# Patient Record
Sex: MALE | Race: WHITE | NOT HISPANIC OR LATINO | Employment: PART TIME | URBAN - METROPOLITAN AREA
[De-identification: names, ages, dates, MRNs, and addresses within clinical notes are randomized per-mention and may not be internally consistent; named-entity substitution may affect disease eponyms.]

---

## 2023-08-25 ENCOUNTER — APPOINTMENT (OUTPATIENT)
Dept: CT IMAGING | Facility: HOSPITAL | Age: 68
End: 2023-08-25
Payer: COMMERCIAL

## 2023-08-25 ENCOUNTER — HOSPITAL ENCOUNTER (OUTPATIENT)
Facility: HOSPITAL | Age: 68
Setting detail: OBSERVATION
Discharge: HOME | End: 2023-08-26
Attending: EMERGENCY MEDICINE | Admitting: STUDENT IN AN ORGANIZED HEALTH CARE EDUCATION/TRAINING PROGRAM
Payer: COMMERCIAL

## 2023-08-25 ENCOUNTER — APPOINTMENT (EMERGENCY)
Dept: RADIOLOGY | Facility: HOSPITAL | Age: 68
End: 2023-08-25
Payer: COMMERCIAL

## 2023-08-25 DIAGNOSIS — K59.00 CONSTIPATION: ICD-10-CM

## 2023-08-25 DIAGNOSIS — I10 HYPERTENSION: ICD-10-CM

## 2023-08-25 DIAGNOSIS — R07.9 CHEST PAIN: ICD-10-CM

## 2023-08-25 DIAGNOSIS — J12.9 VIRAL PNEUMONIA: ICD-10-CM

## 2023-08-25 DIAGNOSIS — J18.9 PNEUMONIA: Primary | ICD-10-CM

## 2023-08-25 DIAGNOSIS — R06.82 TACHYPNEA: ICD-10-CM

## 2023-08-25 DIAGNOSIS — R91.8 GROUND GLASS OPACITY PRESENT ON IMAGING OF LUNG: ICD-10-CM

## 2023-08-25 PROBLEM — R93.89 ABNORMAL CT OF THE CHEST: Status: ACTIVE | Noted: 2023-08-25

## 2023-08-25 PROBLEM — R07.1 CHEST PAIN ON BREATHING: Status: ACTIVE | Noted: 2023-08-25

## 2023-08-25 LAB
2HR DELTA HS TROPONIN: 1 NG/L
4HR DELTA HS TROPONIN: 2 NG/L
ALBUMIN SERPL BCP-MCNC: 4.1 G/DL (ref 3.5–5)
ALP SERPL-CCNC: 153 U/L (ref 34–104)
ALT SERPL W P-5'-P-CCNC: 69 U/L (ref 7–52)
ANION GAP SERPL CALCULATED.3IONS-SCNC: 8 MMOL/L
AST SERPL W P-5'-P-CCNC: 39 U/L (ref 13–39)
ATRIAL RATE: 83 BPM
BASOPHILS # BLD AUTO: 0.04 THOUSANDS/ÂΜL (ref 0–0.1)
BASOPHILS NFR BLD AUTO: 1 % (ref 0–1)
BILIRUB SERPL-MCNC: 1.06 MG/DL (ref 0.2–1)
BUN SERPL-MCNC: 19 MG/DL (ref 5–25)
CALCIUM SERPL-MCNC: 9.5 MG/DL (ref 8.4–10.2)
CARDIAC TROPONIN I PNL SERPL HS: 12 NG/L
CARDIAC TROPONIN I PNL SERPL HS: 13 NG/L
CARDIAC TROPONIN I PNL SERPL HS: 14 NG/L
CHLORIDE SERPL-SCNC: 99 MMOL/L (ref 96–108)
CO2 SERPL-SCNC: 27 MMOL/L (ref 21–32)
CREAT SERPL-MCNC: 0.9 MG/DL (ref 0.6–1.3)
D DIMER PPP FEU-MCNC: 1.15 UG/ML FEU
EOSINOPHIL # BLD AUTO: 0.29 THOUSAND/ÂΜL (ref 0–0.61)
EOSINOPHIL NFR BLD AUTO: 4 % (ref 0–6)
ERYTHROCYTE [DISTWIDTH] IN BLOOD BY AUTOMATED COUNT: 15.5 % (ref 11.6–15.1)
FLUAV RNA RESP QL NAA+PROBE: NEGATIVE
FLUBV RNA RESP QL NAA+PROBE: NEGATIVE
GFR SERPL CREATININE-BSD FRML MDRD: 88 ML/MIN/1.73SQ M
GLUCOSE SERPL-MCNC: 98 MG/DL (ref 65–140)
HCT VFR BLD AUTO: 43.7 % (ref 36.5–49.3)
HGB BLD-MCNC: 14.2 G/DL (ref 12–17)
IMM GRANULOCYTES # BLD AUTO: 0.05 THOUSAND/UL (ref 0–0.2)
IMM GRANULOCYTES NFR BLD AUTO: 1 % (ref 0–2)
LYMPHOCYTES # BLD AUTO: 0.81 THOUSANDS/ÂΜL (ref 0.6–4.47)
LYMPHOCYTES NFR BLD AUTO: 11 % (ref 14–44)
MCH RBC QN AUTO: 27.6 PG (ref 26.8–34.3)
MCHC RBC AUTO-ENTMCNC: 32.5 G/DL (ref 31.4–37.4)
MCV RBC AUTO: 85 FL (ref 82–98)
MONOCYTES # BLD AUTO: 0.84 THOUSAND/ÂΜL (ref 0.17–1.22)
MONOCYTES NFR BLD AUTO: 12 % (ref 4–12)
NEUTROPHILS # BLD AUTO: 5.07 THOUSANDS/ÂΜL (ref 1.85–7.62)
NEUTS SEG NFR BLD AUTO: 71 % (ref 43–75)
NRBC BLD AUTO-RTO: 0 /100 WBCS
P AXIS: -26 DEGREES
PLATELET # BLD AUTO: 239 THOUSANDS/UL (ref 149–390)
PMV BLD AUTO: 9.2 FL (ref 8.9–12.7)
POTASSIUM SERPL-SCNC: 3.9 MMOL/L (ref 3.5–5.3)
PR INTERVAL: 150 MS
PROCALCITONIN SERPL-MCNC: 0.23 NG/ML
PROT SERPL-MCNC: 9 G/DL (ref 6.4–8.4)
QRS AXIS: -79 DEGREES
QRSD INTERVAL: 124 MS
QT INTERVAL: 396 MS
QTC INTERVAL: 465 MS
RBC # BLD AUTO: 5.15 MILLION/UL (ref 3.88–5.62)
RSV RNA RESP QL NAA+PROBE: NEGATIVE
SARS-COV-2 RNA RESP QL NAA+PROBE: NEGATIVE
SODIUM SERPL-SCNC: 134 MMOL/L (ref 135–147)
T WAVE AXIS: -3 DEGREES
VENTRICULAR RATE: 83 BPM
WBC # BLD AUTO: 7.1 THOUSAND/UL (ref 4.31–10.16)

## 2023-08-25 PROCEDURE — 71250 CT THORAX DX C-: CPT

## 2023-08-25 PROCEDURE — 99285 EMERGENCY DEPT VISIT HI MDM: CPT

## 2023-08-25 PROCEDURE — 85379 FIBRIN DEGRADATION QUANT: CPT | Performed by: STUDENT IN AN ORGANIZED HEALTH CARE EDUCATION/TRAINING PROGRAM

## 2023-08-25 PROCEDURE — 84484 ASSAY OF TROPONIN QUANT: CPT

## 2023-08-25 PROCEDURE — 71046 X-RAY EXAM CHEST 2 VIEWS: CPT

## 2023-08-25 PROCEDURE — 99223 1ST HOSP IP/OBS HIGH 75: CPT | Performed by: STUDENT IN AN ORGANIZED HEALTH CARE EDUCATION/TRAINING PROGRAM

## 2023-08-25 PROCEDURE — 93005 ELECTROCARDIOGRAM TRACING: CPT

## 2023-08-25 PROCEDURE — 0241U HB NFCT DS VIR RESP RNA 4 TRGT: CPT

## 2023-08-25 PROCEDURE — G1004 CDSM NDSC: HCPCS

## 2023-08-25 PROCEDURE — 85025 COMPLETE CBC W/AUTO DIFF WBC: CPT

## 2023-08-25 PROCEDURE — 80053 COMPREHEN METABOLIC PANEL: CPT

## 2023-08-25 PROCEDURE — 84145 PROCALCITONIN (PCT): CPT | Performed by: STUDENT IN AN ORGANIZED HEALTH CARE EDUCATION/TRAINING PROGRAM

## 2023-08-25 PROCEDURE — 36415 COLL VENOUS BLD VENIPUNCTURE: CPT

## 2023-08-25 PROCEDURE — 96365 THER/PROPH/DIAG IV INF INIT: CPT

## 2023-08-25 PROCEDURE — 93010 ELECTROCARDIOGRAM REPORT: CPT | Performed by: INTERNAL MEDICINE

## 2023-08-25 RX ORDER — ACETAMINOPHEN 325 MG/1
650 TABLET ORAL EVERY 6 HOURS PRN
Status: DISCONTINUED | OUTPATIENT
Start: 2023-08-25 | End: 2023-08-26 | Stop reason: HOSPADM

## 2023-08-25 RX ORDER — AMLODIPINE BESYLATE 5 MG/1
5 TABLET ORAL DAILY
Status: DISCONTINUED | OUTPATIENT
Start: 2023-08-25 | End: 2023-08-26 | Stop reason: HOSPADM

## 2023-08-25 RX ORDER — SENNOSIDES 8.6 MG
1 TABLET ORAL DAILY
Status: DISCONTINUED | OUTPATIENT
Start: 2023-08-25 | End: 2023-08-26 | Stop reason: HOSPADM

## 2023-08-25 RX ORDER — LISINOPRIL 5 MG/1
5 TABLET ORAL DAILY
Status: DISCONTINUED | OUTPATIENT
Start: 2023-08-25 | End: 2023-08-26 | Stop reason: HOSPADM

## 2023-08-25 RX ORDER — HEPARIN SODIUM 5000 [USP'U]/ML
5000 INJECTION, SOLUTION INTRAVENOUS; SUBCUTANEOUS EVERY 8 HOURS SCHEDULED
Status: DISCONTINUED | OUTPATIENT
Start: 2023-08-25 | End: 2023-08-26 | Stop reason: HOSPADM

## 2023-08-25 RX ORDER — ONDANSETRON 2 MG/ML
4 INJECTION INTRAMUSCULAR; INTRAVENOUS EVERY 6 HOURS PRN
Status: DISCONTINUED | OUTPATIENT
Start: 2023-08-25 | End: 2023-08-26 | Stop reason: HOSPADM

## 2023-08-25 RX ORDER — CEFTRIAXONE 1 G/50ML
1000 INJECTION, SOLUTION INTRAVENOUS EVERY 24 HOURS
Status: DISCONTINUED | OUTPATIENT
Start: 2023-08-26 | End: 2023-08-26 | Stop reason: HOSPADM

## 2023-08-25 RX ORDER — METHYLPREDNISOLONE SODIUM SUCCINATE 40 MG/ML
40 INJECTION, POWDER, LYOPHILIZED, FOR SOLUTION INTRAMUSCULAR; INTRAVENOUS DAILY
Status: DISCONTINUED | OUTPATIENT
Start: 2023-08-25 | End: 2023-08-26 | Stop reason: HOSPADM

## 2023-08-25 RX ORDER — DOCUSATE SODIUM 100 MG/1
100 CAPSULE, LIQUID FILLED ORAL 2 TIMES DAILY
Status: DISCONTINUED | OUTPATIENT
Start: 2023-08-25 | End: 2023-08-26 | Stop reason: HOSPADM

## 2023-08-25 RX ORDER — OMEPRAZOLE 20 MG/1
20 CAPSULE, DELAYED RELEASE ORAL DAILY
COMMUNITY

## 2023-08-25 RX ORDER — CALCIUM CARBONATE 500 MG/1
1000 TABLET, CHEWABLE ORAL DAILY PRN
Status: DISCONTINUED | OUTPATIENT
Start: 2023-08-25 | End: 2023-08-26 | Stop reason: HOSPADM

## 2023-08-25 RX ORDER — CEFTRIAXONE 1 G/50ML
1000 INJECTION, SOLUTION INTRAVENOUS ONCE
Status: COMPLETED | OUTPATIENT
Start: 2023-08-25 | End: 2023-08-25

## 2023-08-25 RX ADMIN — HEPARIN SODIUM 5000 UNITS: 5000 INJECTION INTRAVENOUS; SUBCUTANEOUS at 21:39

## 2023-08-25 RX ADMIN — AMLODIPINE BESYLATE 5 MG: 5 TABLET ORAL at 17:32

## 2023-08-25 RX ADMIN — HEPARIN SODIUM 5000 UNITS: 5000 INJECTION INTRAVENOUS; SUBCUTANEOUS at 17:31

## 2023-08-25 RX ADMIN — LISINOPRIL 5 MG: 5 TABLET ORAL at 17:32

## 2023-08-25 RX ADMIN — CEFTRIAXONE 1000 MG: 1 INJECTION, SOLUTION INTRAVENOUS at 14:00

## 2023-08-25 RX ADMIN — DOXYCYCLINE 100 MG: 100 INJECTION, POWDER, LYOPHILIZED, FOR SOLUTION INTRAVENOUS at 15:06

## 2023-08-25 RX ADMIN — METHYLPREDNISOLONE SODIUM SUCCINATE 40 MG: 40 INJECTION, POWDER, FOR SOLUTION INTRAMUSCULAR; INTRAVENOUS at 17:29

## 2023-08-25 RX ADMIN — ONDANSETRON 4 MG: 2 INJECTION INTRAMUSCULAR; INTRAVENOUS at 17:31

## 2023-08-25 NOTE — ED PROVIDER NOTES
History  Chief Complaint   Patient presents with   • Chest Pain     Pt presents ambulatory stating "I think my pneumonia is coming back. I've been coughing like crazy and started experiencing CP this morning that hasn't gone away." CP is nonradiating. Patient is a 60-year-old male with a past medical history of hypertension, depression, hidradenitis suppurativa presenting to the emergency department for evaluation of cough and chest pain. Patient reports he has had a productive cough for the past few days that he states feels similar to his prior pneumonia in the winter of last year. Patient reports today he had a sudden onset of left-sided chest pain that felt sharp in sensation. Patient denies any shortness of breath or difficulty breathing. Patient denies fevers at home. Patient reports he does see the [address] [place] [address] and was told to go to the emergency department for further evaluation. Patient reports he has tenderness to the left side of his chest when palpating. Patient denies any radiation of the chest pain. Patient denies any alleviation of the chest pain. Denies fevers, chills, rash, headache, weakness, dizziness, visual changes, abdominal pain, nausea, vomiting, diarrhea, constipation, shortness of breath or difficulty breathing. Does not offer any other concerns or complaints. None       History reviewed. No pertinent past medical history. History reviewed. No pertinent surgical history. History reviewed. No pertinent family history. I have reviewed and agree with the history as documented. E-Cigarette/Vaping     E-Cigarette/Vaping Substances     Social History     Tobacco Use   • Smoking status: Never   • Smokeless tobacco: Never   Substance Use Topics   • Alcohol use: Yes     Comment: 1-2 drinks a day   • Drug use: Yes     Types: Marijuana       Review of Systems   Constitutional: Negative for chills and fever. HENT: Negative for ear pain and sore throat.     Eyes: Negative for pain and visual disturbance. Respiratory: Positive for cough. Negative for shortness of breath. Cardiovascular: Positive for chest pain. Negative for palpitations. Gastrointestinal: Negative for abdominal pain, constipation, diarrhea, nausea and vomiting. Genitourinary: Negative for dysuria and hematuria. Musculoskeletal: Negative for arthralgias and back pain. Skin: Negative for color change and rash. Neurological: Negative for seizures and syncope. All other systems reviewed and are negative. Physical Exam  Physical Exam  Vitals and nursing note reviewed. Constitutional:       General: He is not in acute distress. Appearance: Normal appearance. He is well-developed. He is not toxic-appearing or diaphoretic. HENT:      Head: Normocephalic and atraumatic. Right Ear: External ear normal.      Left Ear: External ear normal.      Nose: Nose normal.      Mouth/Throat:      Mouth: Mucous membranes are moist.   Eyes:      General: No scleral icterus. Right eye: No discharge. Left eye: No discharge. Conjunctiva/sclera: Conjunctivae normal.   Cardiovascular:      Rate and Rhythm: Normal rate and regular rhythm. Heart sounds: No murmur heard. Pulmonary:      Effort: Pulmonary effort is normal. No respiratory distress. Breath sounds: Examination of the right-lower field reveals rhonchi. Rhonchi present. No decreased breath sounds, wheezing or rales. Chest:      Chest wall: Tenderness present. Abdominal:      Palpations: Abdomen is soft. Tenderness: There is no abdominal tenderness. Musculoskeletal:         General: No swelling, deformity or signs of injury. Normal range of motion. Cervical back: Normal range of motion and neck supple. No rigidity. Skin:     General: Skin is warm and dry. Capillary Refill: Capillary refill takes less than 2 seconds. Coloration: Skin is not jaundiced. Findings: No erythema or rash.    Neurological: General: No focal deficit present. Mental Status: He is alert and oriented to person, place, and time. Mental status is at baseline. Cranial Nerves: No cranial nerve deficit. Gait: Gait normal.   Psychiatric:         Mood and Affect: Mood normal.         Behavior: Behavior normal.         Thought Content: Thought content normal.         Judgment: Judgment normal.         Vital Signs  ED Triage Vitals   Temperature Pulse Respirations Blood Pressure SpO2   08/25/23 1215 08/25/23 1215 08/25/23 1215 08/25/23 1215 08/25/23 1215   98.3 °F (36.8 °C) 76 20 (!) 184/106 97 %      Temp Source Heart Rate Source Patient Position - Orthostatic VS BP Location FiO2 (%)   08/25/23 1215 08/25/23 1215 08/25/23 1245 08/25/23 1245 --   Oral Monitor Lying Left arm       Pain Score       --                  Vitals:    08/25/23 1245 08/25/23 1300 08/25/23 1357 08/25/23 1400   BP: 155/88 155/76 164/79 147/86   Pulse: 78 76 81 79   Patient Position - Orthostatic VS: Lying Lying Lying Sitting         Visual Acuity      ED Medications  Medications   doxycycline (VIBRAMYCIN) 100 mg in sodium chloride 0.9 % 100 mL IVPB (has no administration in time range)   cefTRIAXone (ROCEPHIN) IVPB (premix in dextrose) 1,000 mg 50 mL (1,000 mg Intravenous New Bag 8/25/23 1400)       Diagnostic Studies  Results Reviewed     Procedure Component Value Units Date/Time    FLU/RSV/COVID - if FLU/RSV clinically relevant [[id number]]  (Normal) Collected: 08/25/23 1241    Lab Status: Final result Specimen: Nares from Nose Updated: 08/25/23 1342     SARS-CoV-2 Negative     INFLUENZA A PCR Negative     INFLUENZA B PCR Negative     RSV PCR Negative    Narrative:      FOR PEDIATRIC PATIENTS - copy/paste COVID Guidelines URL to browser: [email or web address]    SARS-CoV-2 assay is a Nucleic Acid Amplification assay intended for the  qualitative detection of nucleic acid from SARS-CoV-2 in nasopharyngeal  swabs. Results are for the presumptive identification of SARS-CoV-2 RNA. Positive results are indicative of infection with SARS-CoV-2, the virus  causing COVID-19, but do not rule out bacterial infection or co-infection  with other viruses. Laboratories within the [facility] and its  territories are required to report all positive results to the appropriate  public health authorities. Negative results do not preclude SARS-CoV-2  infection and should not be used as the sole basis for treatment or other  patient management decisions. Negative results must be combined with  clinical observations, patient history, and epidemiological information. This test has not been FDA cleared or approved. This test has been authorized by FDA under an Emergency Use Authorization  (EUA). This test is only authorized for the duration of time the  declaration that circumstances exist justifying the authorization of the  emergency use of an in vitro diagnostic tests for detection of SARS-CoV-2  virus and/or diagnosis of COVID-19 infection under section 564(b)(1) of  the Act, 21 U. S.C. 699ZOH-2(E)(8), unless the authorization is terminated  or revoked sooner. The test has been validated but independent review by FDA  and CLIA is pending. Test performed using [employer] GeneXpert: This RT-PCR assay targets N2,  a region unique to SARS-CoV-2. A conserved region in the E-gene was chosen  for pan-Sarbecovirus detection which includes SARS-CoV-2. According to CMS-2020-01-R, this platform meets the definition of high-throughput technology.     HS Troponin 0hr (reflex protocol) [[id number]]  (Normal) Collected: 08/25/23 1241    Lab Status: Final result Specimen: Blood from Arm, Right Updated: 08/25/23 1323     hs TnI 0hr 12 ng/L     HS Troponin I 2hr [[id number]]     Lab Status: No result Specimen: Blood     Comprehensive metabolic panel [[id number]]  (Abnormal) Collected: 08/25/23 1241    Lab Status: Final result Specimen: Blood from Arm, Right Updated: 08/25/23 1317     Sodium 134 mmol/L      Potassium 3.9 mmol/L      Chloride 99 mmol/L      CO2 27 mmol/L      ANION GAP 8 mmol/L      BUN 19 mg/dL      Creatinine 0.90 mg/dL      Glucose 98 mg/dL      Calcium 9.5 mg/dL      AST 39 U/L      ALT 69 U/L      Alkaline Phosphatase 153 U/L      Total Protein 9.0 g/dL      Albumin 4.1 g/dL      Total Bilirubin 1.06 mg/dL      eGFR 88 ml/min/1.73sq m     Narrative:      [place] guidelines for Chronic Kidney Disease (CKD):   •  Stage 1 with normal or high GFR (GFR > 90 mL/min/1.73 square meters)  •  Stage 2 Mild CKD (GFR = 60-89 mL/min/1.73 square meters)  •  Stage 3A Moderate CKD (GFR = 45-59 mL/min/1.73 square meters)  •  Stage 3B Moderate CKD (GFR = 30-44 mL/min/1.73 square meters)  •  Stage 4 Severe CKD (GFR = 15-29 mL/min/1.73 square meters)  •  Stage 5 End Stage CKD (GFR <15 mL/min/1.73 square meters)  Note: GFR calculation is accurate only with a steady state creatinine    CBC and differential [[id number]]  (Abnormal) Collected: 08/25/23 1241    Lab Status: Final result Specimen: Blood from Arm, Right Updated: 08/25/23 1257     WBC 7.10 Thousand/uL      RBC 5.15 Million/uL      Hemoglobin 14.2 g/dL      Hematocrit 43.7 %      MCV 85 fL      MCH 27.6 pg      MCHC 32.5 g/dL      RDW 15.5 %      MPV 9.2 fL      Platelets 770 Thousands/uL      nRBC 0 /100 WBCs      Neutrophils Relative 71 %      Immat GRANS % 1 %      Lymphocytes Relative 11 %      Monocytes Relative 12 %      Eosinophils Relative 4 %      Basophils Relative 1 %      Neutrophils Absolute 5.07 Thousands/µL      Immature Grans Absolute 0.05 Thousand/uL      Lymphocytes Absolute 0.81 Thousands/µL      Monocytes Absolute 0.84 Thousand/µL      Eosinophils Absolute 0.29 Thousand/µL      Basophils Absolute 0.04 Thousands/µL                  XR chest 2 views   Final Result by Rolanda Sosa MD (08/25 1252)      Bilateral groundglass infiltrates right greater which may reflect COVID-19 pneumonia. The study was marked in [facility] for immediate notification. Workstation performed: [id number]                    Procedures  ECG 12 Lead Documentation Only    Date/Time: 8/25/2023 12:20 PM    Performed by: Paulie Hernandez PA-C  Authorized by: Pauile Hernandez PA-C    Indications / Diagnosis:  Chest Pain  ECG reviewed by me, the ED Provider: yes    Patient location:  ED  Previous ECG:     Previous ECG:  Unavailable  Interpretation:     Interpretation: abnormal    Rate:     ECG rate:  83    ECG rate assessment: normal    Rhythm:     Rhythm: sinus rhythm    Ectopy:     Ectopy: PAC    QRS:     QRS axis:  Normal    QRS intervals:  Normal  Conduction:     Conduction: normal    ST segments:     ST segments:  Normal  T waves:     T waves: normal    Comments:      Bifascicular block             ED Course  ED Course as of 08/25/23 1435   Fri Aug 25, 2023   1324 hs TnI 0hr: 12   1333 Respirations(!): 32  At rest               Medical Decision Making    This is a 27-year-old male present to the emergency department for evaluation of cough and chest pain. Patient reports he had a cough for the past few days, states it is productive in nature. Patient reports having sudden onset of sharp left-sided chest pain this morning that is worse with palpation. Patient reports he states the cough feels similar to when he had pneumonia last winter. Patient is in no acute distress, stable vital signs on initial examination. Differential diagnosis to include but is not limited to: ACS, STEMI, arrhythmia, pneumonia, COVID/flu/RSV, acute viral syndrome    Initial ED Plan: imaging, labs, EKG    ED results:  0 hour troponin: 12  Heart score: 5  Bilateral groundglass infiltrates right greater which may reflect COVID-19 pneumonia  -Patient negative for COVID/flu/RSV. We will treat pneumonia with antibiotics.   Patient became tachypneic at rest, is not hypoxic. Patient was ambulated with ambulatory pulse ox of 96%. Final ED assessment: Patient is stable and well appearing. Discussed radiologic studies and laboratory results. Patient verbalized understanding and is agreeable with the plan for admission. Discussed with Dr. Kyra Carlos, observation, bridging orders placed. Amount and/or Complexity of Data Reviewed  Labs: ordered. Decision-making details documented in ED Course. Radiology: ordered. Risk  Prescription drug management. Decision regarding hospitalization. Disposition  Final diagnoses:   Pneumonia   Chest pain   Tachypnea     Time reflects when diagnosis was documented in both MDM as applicable and the Disposition within this note     Time User Action Codes Description Comment    8/25/2023  2:31 PM Llana Simmering Add [J18.9] Pneumonia     8/25/2023  2:31 PM Llana Simmering Add [R07.9] Chest pain     8/25/2023  2:31 PM Llana Simmering Add [R06.82] Tachypnea       ED Disposition     ED Disposition   Admit    Condition   Stable    Date/Time   Fri Aug 25, 2023  2:31 PM    Comment   Case was discussed with Dr. Kyra Carlos and the patient's admission status was agreed to be Admission Status: observation status to the service of Dr. Kyra Carlos . Follow-up Information    None         Patient's Medications    No medications on file       No discharge procedures on file.     PDMP Review     None          ED Provider  Electronically Signed by           Bertha Tran PA-C  08/25/23 4107

## 2023-08-25 NOTE — H&P
[address]  H&P  Name: Arabella Diane 80 y.o. male I MRN: [id number]  Unit/Bed#: -02 I Date of Admission: 8/25/2023   Date of Service: 8/25/2023 I Hospital Day: 0      Assessment/Plan   * Ground glass opacity present on imaging of lung  Assessment & Plan  · Present on admission with respiratory distress  CXR showing multifocal ground glass opacities   · No hypoxia noted on exam, but notably tachypneic   · Covid 19 negative, could be false negative  · Start steroids, Order CT chest, check d dimer, order procalcitonin  · Received dose of IV antibiotics, continue for now   · Give dose of IV steroids   · Based on results of imaging, consider pulmonology consult    Chest pain on breathing  Assessment & Plan  · Likely in setting of unspecified URI  · Pain is worse on inspiration  · DUONG score 1  · ACS unlikely     Hypertension  Assessment & Plan  · Better controlled now  · Resume home meds  · Monitor        VTE Pharmacologic Prophylaxis:   Moderate Risk (Score 3-4) - Pharmacological DVT Prophylaxis Ordered: heparin. Code Status: Level 1 - Full Code   Discussion with family: Patient declined call to . Anticipated Length of Stay: Patient will be admitted on an observation basis with an anticipated length of stay of less than 2 midnights secondary to tachypnea. Total Time Spent on Date of Encounter in care of patient: 40 minutes This time was spent on one or more of the following: performing physical exam; counseling and coordination of care; obtaining or reviewing history; documenting in the medical record; reviewing/ordering tests, medications or procedures; communicating with other healthcare professionals and discussing with patient's family/caregivers. Chief Complaint: chest pain     History of Present Illness:  Arabella Diane is a 80 y.o. male with a PMH of htn who presents with chest pain with cough for last several days.  Patient reports that cough has been worsening and he was treated for pneumonia last winger. He reports chest pain is worse on inspiration and besides cough he does not have any associated symptoms. He came to the ED for further evaluation. In the ED, patient was noted to be in respiratory distress and admitted to [facility] for further management. Review of Systems:  Review of Systems   Constitutional: Negative for chills and fever. HENT: Negative for ear pain and sore throat. Eyes: Negative for pain and visual disturbance. Respiratory: Positive for cough. Negative for shortness of breath. Cardiovascular: Positive for chest pain. Negative for palpitations. Gastrointestinal: Negative for abdominal pain and vomiting. Genitourinary: Negative for dysuria and hematuria. Musculoskeletal: Negative for arthralgias and back pain. Skin: Negative for color change and rash. Neurological: Negative for seizures and syncope. All other systems reviewed and are negative. Past Medical and Surgical History:   History reviewed. No pertinent past medical history. Past Surgical History:   Procedure Laterality Date   • GALLBLADDER SURGERY         Meds/Allergies:  Prior to Admission medications    Not on File     I have reviewed home medications using recent Epic encounter. Allergies: No Known Allergies    Social History:  Marital Status: Single   Occupation: na  Patient Pre-hospital Living Situation: Home  Patient Pre-hospital Level of Mobility: walks  Patient Pre-hospital Diet Restrictions: na  Substance Use History:   Social History     Substance and Sexual Activity   Alcohol Use Yes    Comment: 1-2 drinks a day     Social History     Tobacco Use   Smoking Status Never   Smokeless Tobacco Never     Social History     Substance and Sexual Activity   Drug Use Yes   • Types: Marijuana       Family History:  History reviewed. No pertinent family history.     Physical Exam:     Vitals:   Blood Pressure: 137/84 (08/25/23 1630)  Pulse: 81 (08/25/23 1630)  Temperature: 98.3 °F (36.8 °C) (08/25/23 1630)  Temp Source: Oral (08/25/23 1630)  Respirations: 18 (08/25/23 1630)  Height: 5' 7" (170.2 cm) (08/25/23 1630)  Weight - Scale: 77.7 kg (171 lb 4.8 oz) (08/25/23 1630)  SpO2: 95 % (08/25/23 1630)    Physical Exam  Constitutional:       General: He is not in acute distress. Appearance: Normal appearance. He is not toxic-appearing. Cardiovascular:      Rate and Rhythm: Normal rate and regular rhythm. Heart sounds: Normal heart sounds. No murmur heard. Pulmonary:      Effort: Respiratory distress present. Breath sounds: Normal breath sounds. No wheezing. Abdominal:      General: Abdomen is flat. There is no distension. Palpations: Abdomen is soft. Tenderness: There is no abdominal tenderness. Neurological:      General: No focal deficit present. Mental Status: He is alert and oriented to person, place, and time. Mental status is at baseline. Motor: No weakness.           Additional Data:     Lab Results:  Results from last 7 days   Lab Units 08/25/23  1241   WBC Thousand/uL 7.10   HEMOGLOBIN g/dL 14.2   HEMATOCRIT % 43.7   PLATELETS Thousands/uL 239   NEUTROS PCT % 71   LYMPHS PCT % 11*   MONOS PCT % 12   EOS PCT % 4     Results from last 7 days   Lab Units 08/25/23  1241   SODIUM mmol/L 134*   POTASSIUM mmol/L 3.9   CHLORIDE mmol/L 99   CO2 mmol/L 27   BUN mg/dL 19   CREATININE mg/dL 0.90   ANION GAP mmol/L 8   CALCIUM mg/dL 9.5   ALBUMIN g/dL 4.1   TOTAL BILIRUBIN mg/dL 1.06*   ALK PHOS U/L 153*   ALT U/L 69*   AST U/L 39   GLUCOSE RANDOM mg/dL 98                 Results from last 7 days   Lab Units 08/25/23  1715   PROCALCITONIN ng/ml 0.23       Lines/Drains:  Invasive Devices     Peripheral Intravenous Line  Duration           Peripheral IV 08/25/23 Right Antecubital <1 day                    Imaging: Reviewed radiology reports from this admission including: xray(s)  XR chest 2 views   Final Result by Marilee Britt MD (08/25 1252)      Bilateral groundglass infiltrates right greater which may reflect COVID-19 pneumonia. The study was marked in [facility] for immediate notification. Workstation performed: [id number]         CT chest wo contrast    (Results Pending)       EKG and Other Studies Reviewed on Admission:   · EKG: pending scan into epic chart. ** Please Note: This note has been constructed using a voice recognition system.  **

## 2023-08-25 NOTE — PLAN OF CARE
Problem: PAIN - ADULT  Goal: Verbalizes/displays adequate comfort level or baseline comfort level  Description: Interventions:  - Encourage patient to monitor pain and request assistance  - Assess pain using appropriate pain scale  - Administer analgesics based on type and severity of pain and evaluate response  - Implement non-pharmacological measures as appropriate and evaluate response  - Consider cultural and social influences on pain and pain management  - Notify physician/advanced practitioner if interventions unsuccessful or patient reports new pain  Outcome: Progressing     Problem: INFECTION - ADULT  Goal: Absence or prevention of progression during hospitalization  Description: INTERVENTIONS:  - Assess and monitor for signs and symptoms of infection  - Monitor lab/diagnostic results  - Monitor all insertion sites, i.e. indwelling lines, tubes, and drains  - Monitor endotracheal if appropriate and nasal secretions for changes in amount and color  - [place] appropriate cooling/warming therapies per order  - Administer medications as ordered  - Instruct and encourage patient and family to use good hand hygiene technique  - Identify and instruct in appropriate isolation precautions for identified infection/condition  Outcome: Progressing  Goal: Absence of fever/infection during neutropenic period  Description: INTERVENTIONS:  - Monitor WBC    Outcome: Progressing

## 2023-08-25 NOTE — ASSESSMENT & PLAN NOTE
· Present on admission with respiratory distress  CXR showing multifocal ground glass opacities   · No hypoxia noted on exam, but notably tachypneic   · Covid 19 negative, could be false negative  · Start steroids, Order CT chest, check d dimer, order procalcitonin  · Received dose of IV antibiotics, continue for now   · Give dose of IV steroids   · Based on results of imaging, consider pulmonology consult

## 2023-08-26 VITALS
OXYGEN SATURATION: 95 % | HEIGHT: 67 IN | HEART RATE: 61 BPM | BODY MASS INDEX: 26.89 KG/M2 | SYSTOLIC BLOOD PRESSURE: 144 MMHG | WEIGHT: 171.3 LBS | TEMPERATURE: 98.2 F | RESPIRATION RATE: 18 BRPM | DIASTOLIC BLOOD PRESSURE: 94 MMHG

## 2023-08-26 PROBLEM — J12.9 VIRAL PNEUMONIA: Status: ACTIVE | Noted: 2023-08-25

## 2023-08-26 PROBLEM — R79.89 POSITIVE D DIMER: Status: ACTIVE | Noted: 2023-08-26

## 2023-08-26 LAB
ANION GAP SERPL CALCULATED.3IONS-SCNC: 6 MMOL/L
BUN SERPL-MCNC: 18 MG/DL (ref 5–25)
CALCIUM SERPL-MCNC: 9.9 MG/DL (ref 8.4–10.2)
CHLORIDE SERPL-SCNC: 101 MMOL/L (ref 96–108)
CO2 SERPL-SCNC: 29 MMOL/L (ref 21–32)
CREAT SERPL-MCNC: 0.93 MG/DL (ref 0.6–1.3)
ERYTHROCYTE [DISTWIDTH] IN BLOOD BY AUTOMATED COUNT: 15.5 % (ref 11.6–15.1)
GFR SERPL CREATININE-BSD FRML MDRD: 84 ML/MIN/1.73SQ M
GLUCOSE P FAST SERPL-MCNC: 123 MG/DL (ref 65–99)
GLUCOSE SERPL-MCNC: 123 MG/DL (ref 65–140)
HCT VFR BLD AUTO: 45.4 % (ref 36.5–49.3)
HGB BLD-MCNC: 14.6 G/DL (ref 12–17)
MAGNESIUM SERPL-MCNC: 2.4 MG/DL (ref 1.9–2.7)
MCH RBC QN AUTO: 27.8 PG (ref 26.8–34.3)
MCHC RBC AUTO-ENTMCNC: 32.2 G/DL (ref 31.4–37.4)
MCV RBC AUTO: 86 FL (ref 82–98)
PLATELET # BLD AUTO: 241 THOUSANDS/UL (ref 149–390)
PMV BLD AUTO: 8.8 FL (ref 8.9–12.7)
POTASSIUM SERPL-SCNC: 5.2 MMOL/L (ref 3.5–5.3)
RBC # BLD AUTO: 5.26 MILLION/UL (ref 3.88–5.62)
SODIUM SERPL-SCNC: 136 MMOL/L (ref 135–147)
WBC # BLD AUTO: 5.1 THOUSAND/UL (ref 4.31–10.16)

## 2023-08-26 PROCEDURE — 80048 BASIC METABOLIC PNL TOTAL CA: CPT | Performed by: STUDENT IN AN ORGANIZED HEALTH CARE EDUCATION/TRAINING PROGRAM

## 2023-08-26 PROCEDURE — 83735 ASSAY OF MAGNESIUM: CPT | Performed by: STUDENT IN AN ORGANIZED HEALTH CARE EDUCATION/TRAINING PROGRAM

## 2023-08-26 PROCEDURE — 85027 COMPLETE CBC AUTOMATED: CPT | Performed by: STUDENT IN AN ORGANIZED HEALTH CARE EDUCATION/TRAINING PROGRAM

## 2023-08-26 PROCEDURE — 99239 HOSP IP/OBS DSCHRG MGMT >30: CPT | Performed by: FAMILY MEDICINE

## 2023-08-26 RX ORDER — CEPHALEXIN 500 MG/1
500 CAPSULE ORAL EVERY 6 HOURS SCHEDULED
Qty: 28 CAPSULE | Refills: 0 | Status: SHIPPED | OUTPATIENT
Start: 2023-08-26 | End: 2023-09-02

## 2023-08-26 RX ORDER — LISINOPRIL 5 MG/1
5 TABLET ORAL DAILY
Qty: 30 TABLET | Refills: 0 | Status: SHIPPED | OUTPATIENT
Start: 2023-08-27 | End: 2023-09-26

## 2023-08-26 RX ORDER — SACCHAROMYCES BOULARDII 250 MG
250 CAPSULE ORAL 2 TIMES DAILY
Qty: 20 CAPSULE | Refills: 0 | Status: SHIPPED | OUTPATIENT
Start: 2023-08-26 | End: 2023-08-26 | Stop reason: SDUPTHER

## 2023-08-26 RX ORDER — SACCHAROMYCES BOULARDII 250 MG
250 CAPSULE ORAL 2 TIMES DAILY
Qty: 20 CAPSULE | Refills: 0 | Status: SHIPPED | OUTPATIENT
Start: 2023-08-26 | End: 2023-09-05

## 2023-08-26 RX ORDER — AMLODIPINE BESYLATE 5 MG/1
5 TABLET ORAL DAILY
Qty: 30 TABLET | Refills: 0 | Status: SHIPPED | OUTPATIENT
Start: 2023-08-27 | End: 2023-09-26

## 2023-08-26 RX ORDER — PREDNISONE 20 MG/1
40 TABLET ORAL DAILY
Qty: 10 TABLET | Refills: 0 | Status: SHIPPED | OUTPATIENT
Start: 2023-08-26 | End: 2023-08-26 | Stop reason: SDUPTHER

## 2023-08-26 RX ORDER — DOCUSATE SODIUM 100 MG/1
100 CAPSULE, LIQUID FILLED ORAL 2 TIMES DAILY
Qty: 30 CAPSULE | Refills: 0 | Status: SHIPPED | OUTPATIENT
Start: 2023-08-26 | End: 2023-08-26 | Stop reason: SDUPTHER

## 2023-08-26 RX ORDER — PREDNISONE 20 MG/1
40 TABLET ORAL DAILY
Qty: 10 TABLET | Refills: 0 | Status: SHIPPED | OUTPATIENT
Start: 2023-08-26 | End: 2023-08-31

## 2023-08-26 RX ORDER — AMLODIPINE BESYLATE 5 MG/1
5 TABLET ORAL DAILY
Qty: 30 TABLET | Refills: 0 | Status: SHIPPED | OUTPATIENT
Start: 2023-08-27 | End: 2023-08-26 | Stop reason: SDUPTHER

## 2023-08-26 RX ORDER — LISINOPRIL 5 MG/1
5 TABLET ORAL DAILY
Qty: 30 TABLET | Refills: 0 | Status: SHIPPED | OUTPATIENT
Start: 2023-08-27 | End: 2023-08-26 | Stop reason: SDUPTHER

## 2023-08-26 RX ORDER — DOCUSATE SODIUM 100 MG/1
100 CAPSULE, LIQUID FILLED ORAL 2 TIMES DAILY
Qty: 30 CAPSULE | Refills: 0 | Status: SHIPPED | OUTPATIENT
Start: 2023-08-26

## 2023-08-26 RX ORDER — CEPHALEXIN 500 MG/1
500 CAPSULE ORAL EVERY 6 HOURS SCHEDULED
Qty: 28 CAPSULE | Refills: 0 | Status: SHIPPED | OUTPATIENT
Start: 2023-08-26 | End: 2023-08-26 | Stop reason: SDUPTHER

## 2023-08-26 RX ADMIN — SENNOSIDES 8.6 MG: 8.6 TABLET, FILM COATED ORAL at 09:51

## 2023-08-26 RX ADMIN — AMLODIPINE BESYLATE 5 MG: 5 TABLET ORAL at 09:51

## 2023-08-26 RX ADMIN — LISINOPRIL 5 MG: 5 TABLET ORAL at 09:51

## 2023-08-26 RX ADMIN — HEPARIN SODIUM 5000 UNITS: 5000 INJECTION INTRAVENOUS; SUBCUTANEOUS at 05:22

## 2023-08-26 RX ADMIN — DOCUSATE SODIUM 100 MG: 100 CAPSULE, LIQUID FILLED ORAL at 09:51

## 2023-08-26 RX ADMIN — METHYLPREDNISOLONE SODIUM SUCCINATE 40 MG: 40 INJECTION, POWDER, FOR SOLUTION INTRAMUSCULAR; INTRAVENOUS at 09:57

## 2023-08-26 RX ADMIN — CEFTRIAXONE 1000 MG: 1 INJECTION, SOLUTION INTRAVENOUS at 09:50

## 2023-08-26 NOTE — ASSESSMENT & PLAN NOTE
· /98   Pulse 61   Temp 98.2 °F (36.8 °C)   Resp 18   Ht 5' 7" (1.702 m)   Wt 77.7 kg (171 lb 4.8 oz)   SpO2 95%   BMI 26.83 kg/m²   · Stable pressures  · Resume home meds  · Monitor

## 2023-08-26 NOTE — ASSESSMENT & PLAN NOTE
· Present on admission with respiratory distress. OA No hypoxia noted on exam, but notably tachypneic   · CT concerning with GGO and concern of PNA, procal and wbc negative, low likelihood of bacterial infection  · Empirically covered with ceftriaxone and now switching to PO keflex at DC  · Covid 19 negative  · On IV solumedrol but does not have h/o reactive air way disease, will switch to PO prednisone at DC x 5 days  · Also has mediastinal lymphadenopathy, which could be reactive to the inflammatory or infectious state.  OP pulmonary followup  Will refer to pulmonary OP

## 2023-08-26 NOTE — ASSESSMENT & PLAN NOTE
· Likely in setting of unspecified URI  · Pain is worse on inspiration  · DUONG score 1  · ACS unlikely

## 2023-08-26 NOTE — DISCHARGE SUMMARY
[address]  Discharge- Kaci Pierre 1955, 80 y.o. male MRN: [id number]  Unit/Bed#: -02 Encounter: [id number]  Primary Care Provider: No primary care provider on file. Date and time admitted to hospital: 8/25/2023 12:09 PM    * Viral pneumonia  Assessment & Plan  · Present on admission with respiratory distress. OA No hypoxia noted on exam, but notably tachypneic   · CT concerning with GGO and concern of PNA, procal and wbc negative, low likelihood of bacterial infection  · Empirically covered with ceftriaxone and now switching to PO keflex at DC  · Covid 19 negative  · On IV solumedrol but does not have h/o reactive air way disease, will switch to PO prednisone at DC x 5 days  · Also has mediastinal lymphadenopathy, which could be reactive to the inflammatory or infectious state. OP pulmonary followup  Will refer to pulmonary OP    Positive D dimer  Assessment & Plan  · No tachycardia, no hypoxia but pt had pleuritic CP OA, dimer elevated- no clinical signs of DVT  · Low likelihood of PE on wells criteria  · Elevated from viral infection    Chest pain on breathing  Assessment & Plan  · Likely in setting of unspecified viral URI  · Pain is worse on inspiration- now better with treatment  · DUONG score 1  · ACS unlikely     Hypertension  Assessment & Plan  · /98   Pulse 61   Temp 98.2 °F (36.8 °C)   Resp 18   Ht 5' 7" (1.702 m)   Wt 77.7 kg (171 lb 4.8 oz)   SpO2 95%   BMI 26.83 kg/m²   · Stable pressures  · Resume home meds  · Monitor     Medical Problems     Resolved Problems  Date Reviewed: 8/26/2023   None       Discharging Physician / Practitioner: Leopoldo Rondo, MD  PCP: No primary care provider on file.   Admission Date:   Admission Orders (From admission, onward)     Ordered        08/25/23 1432  Place in Observation  Once                      Discharge Date: 08/26/23    Consultations During Hospital Stay:  · None    Procedures Performed:   · none    Significant Findings / Test Results:   · Viral pneumonitis    Incidental Findings:   · none    Test Results Pending at Discharge (will require follow up):   · none     Outpatient Tests Requested:  · none    Complications: none    Reason for Admission: SOB    Hospital Course:   Sangeetha Plaza is a 80 y.o. male patient who originally presented to the hospital on 8/25/2023 due to shortness of breath and chest pain. Shortness of breath has improved significantly after antibiotics. Patient was started on steroids and his shortness of breath has significantly improved. CT scan was reviewed and it shows groundglass opacities concerning for viral pneumonia as the procalcitonin and white blood cell count and temperature is normal.  D-dimer was elevated but patient does not have any signs of DVT clinically. Wells criteria was evaluated and does not meet risk requirements for further treatment. Patient is not tachycardic, not hypoxic, chest pain is improved and shortness of breath is improved as well. Cleared for discharge home on oral antibiotics and steroids. Outpatient pulmonary referral ordered    Please see above list of diagnoses and related plan for additional information.      Condition at Discharge: stable    Discharge Day Visit / Exam:   Subjective:  "I am feeling better"  Vitals: Blood Pressure: 144/94 (08/26/23 0951)  Pulse: 61 (08/26/23 0717)  Temperature: 98.2 °F (36.8 °C) (08/26/23 0717)  Temp Source: Oral (08/25/23 1630)  Respirations: 18 (08/25/23 1630)  Height: 5' 7" (170.2 cm) (08/25/23 1630)  Weight - Scale: 77.7 kg (171 lb 4.8 oz) (08/25/23 1630)  SpO2: 95 % (08/26/23 0717)  Exam:   Physical Exam General- Awake, alert and oriented x 3, looks comfortable  HEENT- Normocephalic, atraumatic, oral mucosa- moist  Neck- Supple, No carotid bruit, no JVD  CVS- Normal S1/ S2, Regular rate and rhythm, No murmur, No edema  Respiratory system-some wheezing audible on the right side of the lung, no crepitations or crackles  Abdomen- Soft, Non distended, no tenderness, Bowel sound- present 4 quads  Genitourinary- No suprapubic tenderness, No CVA tenderness  Skin- No new bruise or rash  Musculoskeletal- No gross deformity  Psych- No acute psychosis  CNS- CN II- XII grossly intact, No acute focal neurologic deficit noted      Discussion with Family: Patient declined call to . Discharge instructions/Information to patient and family:   See after visit summary for information provided to patient and family. Provisions for Follow-Up Care:  See after visit summary for information related to follow-up care and any pertinent home health orders. Disposition:   Home    Planned Readmission: no     Discharge Statement:  I spent 45 minutes discharging the patient. This time was spent on the day of discharge. I had direct contact with the patient on the day of discharge. Greater than 50% of the total time was spent examining patient, answering all patient questions, arranging and discussing plan of care with patient as well as directly providing post-discharge instructions. Additional time then spent on discharge activities. Discharge Medications:  See after visit summary for reconciled discharge medications provided to patient and/or family.       **Please Note: This note may have been constructed using a voice recognition system**

## 2023-08-26 NOTE — ASSESSMENT & PLAN NOTE
· No tachycardia, no hypoxia but pt had pleuritic CP OA, dimer elevated- no clinical signs of DVT  · Low likelihood of PE on wells criteria  · Elevated from viral infection

## 2023-08-26 NOTE — ASSESSMENT & PLAN NOTE
· Likely in setting of unspecified viral URI  · Pain is worse on inspiration- now better with treatment  · DUONG score 1  · ACS unlikely

## 2023-09-19 ENCOUNTER — TELEPHONE (OUTPATIENT)
Age: 68
End: 2023-09-19

## 2023-09-19 NOTE — TELEPHONE ENCOUNTER
Spoke to pt in reference to scheduling appt for consult   Pt states the [facility] wants to see him first and evaluate his situation   Pt was advised we are always here to assist if needed